# Patient Record
Sex: FEMALE | Race: WHITE | NOT HISPANIC OR LATINO | Employment: UNEMPLOYED | URBAN - METROPOLITAN AREA
[De-identification: names, ages, dates, MRNs, and addresses within clinical notes are randomized per-mention and may not be internally consistent; named-entity substitution may affect disease eponyms.]

---
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
## 2022-04-30 NOTE — ED PROVIDER NOTES
Patient:   Alexandria Carranza             MRN: [id number]            FIN: [id number]               Age:   20 years     Sex:  Female     :  2000   Associated Diagnoses:   2019 novel coronavirus disease (COVID-19); Dizziness   Author:   Danielito Montilla MD      Basic Information   Time seen: Date & time 2020 09:01:00.   History source: Patient.   Arrival mode: Private vehicle.   Additional information: Chief Complaint from Nursing Triage Note : Chief Complaint   2020 8:47 CST      Chief Complaint           Father states patient's mother is +covid, states pt has been c/o dizziness, states patient orthostatic at home, also c/o generalized weakness  .   Provider/Visit info:   Time Seen:  Danielito Montilla MD / 2020 08:56  .   History of Present Illness   The patient presents with Weakness and dizziness.  The onset was 1  days ago.  The course/duration of symptoms is constant.  The degree at onset was moderate.  The degree at present is moderate.  Risk factors consist of History of Down syndrome with PFO repair.  History of obstructive sleep apnea.  Associated symptoms: dizziness.        Review of Systems   Constitutional symptoms:  No fever,    Respiratory symptoms:  No shortness of breath,    Cardiovascular symptoms:  No chest pain,    Gastrointestinal symptoms:  No abdominal pain, no nausea, no vomiting, no diarrhea, no rectal bleeding.    Musculoskeletal symptoms:  No back pain,    Neurologic symptoms:  No headache,              Additional review of systems information: All other systems reviewed and otherwise negative.      Health Status   Allergies:    Allergic Reactions (Selected)  No Known Allergies,    Allergies (1) Active Reaction  No Known Allergies None Documented  .      Past Medical/ Family/ Social History   Medical history:    No active or resolved past medical history items have been selected or recorded..   Surgical history:    No active procedure history items have been  selected or recorded..   Family history:    No family history items have been selected or recorded..   Social history:    Social & Psychosocial Habits    Alcohol  04/19/2018  Concerns about alcohol use in household: No    Tobacco  04/19/2018  Concerns about tobacco use in household: No    11/27/2020  Use: Never (less than 100 in l    Patient Wants Consult For Cessation Counseling No    Abuse/Neglect  11/27/2020  SHX Any signs of abuse or neglect No  .   Problem list:    Active Problems (2)  Down syndrome   Sleep apnea   .      Physical Examination               Vital Signs   Vital Signs   11/27/2020 8:47 CST      Temperature Temporal Artery               36.4 DegC                             Peripheral Pulse Rate     77 bpm                             Respiratory Rate          18 br/min                             SpO2                      98 %                             Oxygen Therapy            Room air                             Systolic Blood Pressure   102 mmHg                             Diastolic Blood Pressure  72 mmHg  .      Vital Signs (last 24 hrs)_____  Last Charted___________  Heart Rate Peripheral   77 bpm  (NOV 27 08:47)  Resp Rate         18 br/min  (NOV 27 08:47)  SBP      102 mmHg  (NOV 27 08:47)  DBP      72 mmHg  (NOV 27 08:47)  SpO2      98 %  (NOV 27 08:47)  Weight      73 kg  (NOV 27 08:47)  Height      152 cm  (NOV 27 08:47)  BMI      31.6  (NOV 27 08:47)  .   Measurements   11/27/2020 8:47 CST      Weight Dosing             73 kg                             Weight Measured           73 kg                             Weight Measured and Calculated in Lbs     160.94 lb                             Weight Estimated          73 kg                             Height/Length Dosing      152 cm                             Height/Length Measured    152 cm                             Height/Length Estimated   152 cm                             Body Mass Index Estimated 31.6 kg/m2                              Body Mass Index Measured  31.6 kg/m2  .   Basic Oxygen Information   11/27/2020 8:47 CST      SpO2                      98 %                             Oxygen Therapy            Room air  .   General:  Alert, no acute distress.    Skin:  Warm, pink, intact, moist, no rash, normal for ethnicity, not cyanotic, not cool, not pale.    Head:  Normocephalic, atraumatic.    Neck:  Supple, trachea midline, no tenderness, no JVD, no carotid bruit.    Eye:  Pupils are equal, round and reactive to light, extraocular movements are intact, normal conjunctiva, vision grossly normal.    Ears, nose, mouth and throat:  Tympanic membranes clear, oral mucosa moist, no pharyngeal erythema or exudate.    Cardiovascular:  Regular rate and rhythm, No murmur, Normal peripheral perfusion, No edema.    Respiratory:  Lungs are clear to auscultation, respirations are non-labored, breath sounds are equal, Symmetrical chest wall expansion.    Chest wall:  No tenderness, No deformity.    Back:  Nontender, Normal range of motion, Normal alignment, no step-offs.    Musculoskeletal:  Normal ROM, normal strength, no tenderness, no swelling, no deformity.    Gastrointestinal:  Soft, Nontender, Non distended, Normal bowel sounds, No organomegaly.    Genitourinary   Neurological:  Alert and oriented to person, place, time, and situation, No focal neurological deficit observed, CN II-XII intact, normal sensory observed, normal motor observed, normal speech observed, normal coordination observed.    Lymphatics:  No lymphadenopathy.   Psychiatric:  Cooperative, appropriate mood & affect, normal judgment.       Medical Decision Making   Differential Diagnosis:  Orthostatic hypotension, Covid infection.   Results review:  Lab results : Lab View   11/27/2020 12:22 CST     Magnesium Lvl             1.91 mg/dL                             EAG                       88.2 mg/dL  NA                             Phosphorus                3.6 mg/dL                              LDH                       135 unit/L  LOW                             Hgb A1c                   4.7 %                             TSH                       0.7243 uIU/mL                             Blood Culture             Review                             Blood Culture             Review    11/27/2020 10:48 CST     UA Appear                 Clear                             UA Color                  YELLOW                             UA Spec Grav              1.018                             UA Bili                   Negative                             UA pH                     6.5                             UA Urobilinogen           Normal                             UA Blood                  Negative                             UA Glucose                Negative                             UA Ketones                Negative                             UA Protein                Negative                             UA Nitrite                Negative                             UA Leuk Est               Negative                             UA WBC Interp             0-2 /HPF                             UA RBC Interp             0-2 /HPF                             UA Bact Interp            None Seen /HPF                             UA Squam Epi Interp        /LPF                             UA Hyal Cast Interp       3-5 /LPF    11/27/2020 10:03 CST     Est Creat Clearance Ser   75.59 mL/min    11/27/2020 9:41 CST      COVID-19 Rapid            POSITIVE    11/27/2020 9:33 CST      Sodium Lvl                140 mmol/L                             Potassium Lvl             4.1 mmol/L                             Chloride                  105 mmol/L                             CO2                       27 mmol/L                             Calcium Lvl               9.0 mg/dL                             Glucose Lvl               102 mg/dL  HI                             BUN                        12.0 mg/dL                             Creatinine                0.86 mg/dL                             eGFR-AA                   >105                             eGFR-NILESH                  91 mL/min/1.73 m2                             Bili Total                1.0 mg/dL                             Bili Direct               0.4 mg/dL                             Bili Indirect             0.60 mg/dL                             AST                       17 unit/L                             ALT                       13 unit/L                             Alk Phos                  65 unit/L                             Total Protein             6.8 gm/dL                             Albumin Lvl               3.9 gm/dL                             Globulin                  2.9 gm/dL                             A/G Ratio                 1.3 ratio                             Ferritin Lvl              204.61 ng/mL  HI                             CRP                       0.41 mg/dL                             Total CK                  38 U/L                             Troponin-I                <0.010 ng/mL                             D-Dimer                   0.29 mcg/ml FEU                             WBC                       3.3 x10(3)/mcL  LOW                             RBC                       4.55 x10(6)/mcL                             Hgb                       14.8 gm/dL                             Hct                       44.4 %                             Platelet                  176 x10(3)/mcL                             MCV                       97.6 fL                             MCH                       32.5 pg                             MCHC                      33.3 gm/dL                             RDW                       12.1 %                             MPV                       9.1 fL                             Abs Neut                  1.78 x10(3)/mcL  LOW                             Neutro Auto                54 %  NA                             Lymph Auto                31 %  NA                             Mono Auto                 13 %  NA                             Eos Auto                  1 %  NA                             Abs Eos                   0.0 x10(3)/mcL                             Basophil Auto             1 %  NA                             Abs Neutro                1.78 x10(3)/mcL  LOW                             Abs Lymph                 1.0 x10(3)/mcL                             Abs Mono                  0.4 x10(3)/mcL                             Abs Baso                  0.0 x10(3)/mcL                             IG%                       0 %  NA                             IG#                       0.010  NA                             Sed Rate                  3 mm/hr  ,    No qualifying data available, All Results, Interpretation.    Chest X-Ray:  No acute disease process.   Notes:  20-year-old female with a history of Down syndrome and obstructive sleep apnea as well as recently COVID-19 positive presents with orthostatic hypotension.  She is otherwise well-appearing and nontoxic.  She will be admitted to internal medicine for supportive care..      Reexamination/ Reevaluation   Vital signs   Basic Oxygen Information   11/27/2020 8:47 CST      SpO2                      98 %                             Oxygen Therapy            Room air        Impression and Plan   Diagnosis   2019 novel coronavirus disease (COVID-19) ([id number] U07.1)   Dizziness (PNED [id number])   Plan   Condition: Stable.    Disposition: Admit to Inpatient Unit.    Counseled: Patient, Regarding diagnosis, Regarding diagnostic results, Regarding treatment plan, Patient indicated understanding of instructions.

## 2022-05-03 NOTE — HISTORICAL [facility]
This is a historical note converted from Joceline. Formatting and pictures may have been removed.  Please reference Joceline for original formatting and attached multimedia. Chief Complaint  Father states patients mother is +covid, states pt has been c/o dizziness, states patient orthostatic at home, also c/o generalized weakness  History of Present Illness  Ms. Carranza is a 20 yoF with PMHx significant for Down syndrome, Hx of PFO w/repair, and DEAN who presented to [facility] ED on 11/27/2020 with complaints of dizziness x 1 day.?Patient states that she was not feeling well yesterday and this morning, with some associated dizziness and orthostatic hypotension, but no syncope or LOC.? Patient was orthostatic positive at home, with increased HR and decreased BP upon standing?.? Patient has known COVID exposure, and tested positive for COVID-19 on arrival to ED.?Patient has very pleasant affect,?and did not seem in any acute distress?on presentation, 99% on room air, with no concerning?CXR findings?or electrolyte derangements, negative troponin and inflammatory markers pending. ??Patient was given 1 L bolus of LR in the ED.  Review of Systems  Constitutional:?no fever, fatigue, weakness  Respiratory:?no cough, no wheezing, no shortness of breath  Cardiovascular:?no chest pain, no palpitations, no edema  Gastrointestinal:?no nausea, vomiting, or diarrhea. No abdominal pain  Genitourinary:?no dysuria, no urinary frequency or urgency, no hematuria  Hema/Lymph:?no abnormal bruising or bleeding  Integumentary:?no skin rash or abnormal lesion  Neurologic: no headache, no dizziness, no weakness or numbness  ?  Physical Exam  Vitals & Measurements  T:?36.4? ?C (Temporal Artery)? HR:?62(Peripheral)? HR:?61(Monitored)? RR:?16? BP:?102/64? BP:?101/60(Sitting)? BP:?94/53(Standing)? BP:?104/69(Supine)? SpO2:?100%? WT:?73?kg? BMI:?31.6?  General: very pleasant,?well-developed well-nourished female in no acute distress  Neck: full range of  motion, no thyromegaly or lymphadenopathy  Respiratory:?clear to auscultation bilaterally, no wheezing, no respiratory distress,?no accessory muscle use  Cardiovascular:?regular rate and rhythm without murmurs, gallops or rubs, no peripheral edema  Gastrointestinal:?soft, non-tender, non-distended with normal bowel sounds, without masses to palpation  Genitourinary: no CVA tenderness to palpation  Musculoskeletal:?full range of motion of all extremities/spine without limitation or discomfort  Integumentary: no rashes or skin lesions present, bilateral?knee scars from previous surgeries, hyperkeratosis of bilateral feet  Neurologic:? no signs of peripheral neurological deficit, motor/sensory function intact, answers questions well and appropriately  ?  Assessment/Plan  COVID Information:  Date of Symptom Onset:??11/26/2020  Date Positive:??11/27/2020  Hospital Day:??1  Oxygen Requirement:??Room air  Inflammatory Markers:?  ???? LDH Trend:??135  ???? D-Dimer Trend:??.29  ???? Ferritin Trend:??204.61  ???? ESR Trend:??3  ???? CRP Trend:??.41?  Renal Function:??BUN 12, creatinine 1.86  Antibiotics:?Azithromycin 500 mg daily,?ceftriaxone?1 g daily?(day 1)  Steroids:?Decadron 4 mg twice daily  Remdesivir:?Day 1  Anticoagulation:??Lovenox 40 mg twice daily  Nutrition:?Regular diet  Complicating factors/Medical problems:?Down syndrome,?Hx of PFO  -CXR on ED presentation revealed no acute cardiopulmonary findings, no bony abnormalities, with sternotomy wires in place from previous surgeries  -Repeat orthostatic vital signs?tomorrow at 8 AM  ?   History of allergies  -Patient takes levocetirizine at home, cetirizine?5 mg inpatient?(inpatient pharmacy does not have levocetirizine)  ?  Down syndrome,?Hx of PFO  -TTE on 11/27/2020, LVEF ~40%, with negative bubble study, no evidence of pericardial effusion, no evidence of pleural effusion, normal atria and ventricular sizes and no evidence of valvular abnormalities  -A1c  4.7  -TSH 0.54925  ?  Given concern for?past medical history?and known +COVID-19 testing, admitting to telemetry for close monitoring.? Patient not requiring supplemental oxygen at this time.? Starting?remdesivir, Decadron 4 mg twice daily,?azithromycin, ceftriaxone,?and anticoagulation with?Lovenox 40 mg twice daily. ?Will closely monitor for any respiratory changes, and inflammatory markers ordered q48hr, and?CBC, CMP ordered q24hr.   Problem List/Past Medical History  Ongoing  -nCoV  Down syndrome  Sleep apnea  Historical  No qualifying data  Procedure/Surgical History  PFO repair  Bilateral knee sx  Medications  Inpatient  azithromycin (Zithromax) for IVPB  Decadron, 4 mg= 1 mL, IV Push, BID  Lactated Ringers 1000ml 1,000 mL, 1000 mL, IV  remdesivir  Remdesivir (for IVPB)  Rocephin 2 g injection  Home  levocetirizine 5 mg oral tablet, 5 mg= 1 tab(s), Oral, qPM  Allergies  No Known Allergies  Social History  Abuse/Neglect  No, 2020  Alcohol  Household alcohol concerns: No., 2018  Tobacco  Never (less than 100 in lifetime), No, 2020  Household tobacco concerns: No., 2018  Lab Results  Labs Last 24 Hours?  ?Chemistry? Hematology/Coagulation?   Sodium Lvl: 140 mmol/L (20 09:33:00) D-Dimer: 0.29 mcg/ml FEU (20 09:33:00)   Potassium Lvl: 4.1 mmol/L (20 09:33:00) WBC:?3.3 x10(3)/mcL?Low (20 09:33:00)   Chloride: 105 mmol/L (20 09:33:00) RBC: 4.55 x10(6)/mcL (20 09:33:00)   CO2: 27 mmol/L (20 09:33:00) Hgb: 14.8 gm/dL (20 09:33:00)   Calcium Lvl: 9 mg/dL (20 09:33:00) Hct: 44.4 % (20 09:33:00)   Magnesium Lvl: 1.91 mg/dL (20 12:22:00) Platelet: 176 x10(3)/mcL (20 09:33:00)   Glucose Lvl:?102 mg/dL?High (20 09:33:00) MCV: 97.6 fL (20 09:33:00)   EA.2 mg/dL (20 12:22:00) MCH: 32.5 pg (20 09:33:00)   BUN: 12 mg/dL (20 09:33:00) MCHC: 33.3 gm/dL (20 09:33:00)   Creatinine: 0.86 mg/dL  (11/27/20 09:33:00) RDW: 12.1 % (11/27/20 09:33:00)   Est Creat Clearance Ser: 75.59 mL/min (11/27/20 10:03:56) MPV: 9.1 fL (11/27/20 09:33:00)   eGFR-AA: >105 (11/27/20 09:33:00) Abs Neut:?1.78 x10(3)/mcL?Low (11/27/20 09:33:00)   eGFR-NILESH: 91 mL/min/1.73 m2 (11/27/20 09:33:00) Neutro Auto: 54 % (11/27/20 09:33:00)   Bili Total: 1 mg/dL (11/27/20 09:33:00) Lymph Auto: 31 % (11/27/20 09:33:00)   Bili Direct: 0.4 mg/dL (11/27/20 09:33:00) Mono Auto: 13 % (11/27/20 09:33:00)   Bili Indirect: 0.6 mg/dL (11/27/20 09:33:00) Eos Auto: 1 % (11/27/20 09:33:00)   AST: 17 unit/L (11/27/20 09:33:00) Abs Eos: 0 x10(3)/mcL (11/27/20 09:33:00)   ALT: 13 unit/L (11/27/20 09:33:00) Basophil Auto: 1 % (11/27/20 09:33:00)   Alk Phos: 65 unit/L (11/27/20 09:33:00) Abs Neutro:?1.78 x10(3)/mcL?Low (11/27/20 09:33:00)   Total Protein: 6.8 gm/dL (11/27/20 09:33:00) Abs Lymph: 1 x10(3)/mcL (11/27/20 09:33:00)   Albumin Lvl: 3.9 gm/dL (11/27/20 09:33:00) Abs Mono: 0.4 x10(3)/mcL (11/27/20 09:33:00)   Globulin: 2.9 gm/dL (11/27/20 09:33:00) Abs Baso: 0 x10(3)/mcL (11/27/20 09:33:00)   A/G Ratio: 1.3 ratio (11/27/20 09:33:00) IG%: 0 % (11/27/20 09:33:00)   Phosphorus: 3.6 mg/dL (11/27/20 12:22:00) IG#: 0.01 (11/27/20 09:33:00)   LDH:?135 unit/L?Low (11/27/20 12:22:00) Sed Rate: 3 mm/hr (11/27/20 09:33:00)   Ferritin Lvl:?204.61 ng/mL?High (11/27/20 09:33:00) ?   Hgb A1c: 4.7 % (11/27/20 12:22:00) ?   CRP: 0.41 mg/dL (11/27/20 09:33:00) ?   Total CK: 38 U/L (11/27/20 09:33:00) ?   Troponin-I: <0.010 (11/27/20 09:33:00) ?   TSH: 0.7243 uIU/mL (11/27/20 12:22:00) ?   ?                                                                                                                                  ?  ?  Diagnostic Results  Accession:?[id number]  Order:?XR Chest 1 View  Report Dt/Tm:?11/27/2020 10:46  Report:?  CHEST 1 VIEW (PORTABLE):  ?  HISTORY: Dyspnea  ?  ?  ?  FINDINGS: The lungs are well expanded and well aerated. No shift of  the  mediastinum. No sizable pneumothorax or pleural effusion.  ?  IMPRESSION:  No active cardiopulmonary finding  ?  ?  ?      Starting maintenance fluids at 84ml/hr of LR to run overnight.? Ordering orthostatic vitals signs TID tomorrow, and placing cardiology consult due to reduced EF on TTE today.   I have reviewed the patients history, residents ?findings on physical examination, diagnosis and treatment plan. Care provided was reasonable and necessary.  Hx of PFO nad ASdDrepair in past.Bubble study neg..fractional shortening NMl. Ef approx 48%.No chamber dilatation. inflam markers :ferritin 204. trending in am. CXr clear.99% on roomair.On rocephin, ?zithromax and remdesvir. cardiac isos pending.BNP ordered.discussed with Dr. Carranza  ?  Addendum by Gonzalez POOLE, Hugo ARSHAD on November 28, 2020 10:27 CST?(Verified)  EKG NSR. no changes. No Rt axis  ?

## 2022-05-03 NOTE — HISTORICAL [facility]
This is a historical note converted from Cerkana. Formatting and pictures may have been removed.  Please reference Cerkana for original formatting and attached multimedia. Admit and Discharge Dates  Admit Date: 11/27/2020  Discharge Date: 11/29/2020  Physicians  Attending Physician - Gonzalez POOLE, Hugo ARSHAD  Admitting Physician - Gonzalez POOLE, Hugo ARSHAD  Consulting Physician - Renu POOLE, Leonardo NOONAN  Primary Care Physician - Tamia POOLE, [place]   Discharge Diagnosis  2019 novel coronavirus disease (COVID-19)?U07.1  Dizziness?[id number]  Admission Information  Ms. Carranza is a 20 yoF with PMHx significant for Down syndrome, Hx of PFO w/repair, and DEAN who presented to [facility] ED on 11/27/2020 with complaints of dizziness x 1 day. Patient states that she was not feeling well yesterday and this morning, with some associated dizziness and orthostatic hypotension, but no syncope or LOC. Patient was orthostatic positive at home, with increased HR and decreased BP upon standing . Patient has known COVID exposure, and tested positive for COVID-19 on arrival to ED. Patient has very pleasant affect, and did not seem in any acute distress on presentation, 99% on room air, with no concerning CXR findings or electrolyte derangements, negative troponin and inflammatory markers pending. Patient was given 1 L bolus of LR in the ED  ?  Hospital Course  Uneventful Patient admitted for dizziness due to Covid-19 infection.? Patient was orthostatically hypotensive which resolved with supplemental fluid administration.? Patient started on ceftriaxone, azithromycin, remdesivir, and Decadron while inpatient and had benefit of ID consult.? Inflammatory markers measures and WNL and continued to trend down while inpatient. Troponins and BNP remained negative while inpatient. ?Patient remained with good appetite while inpatient. ?Patient stable for discharge home on HD#3 with prescription for medrol dose pack.  Significant  Findings  Echo of heart revealed EF of 40%; however on further review appears to be issue with computer read. EF is like at least 48% but recommend follow up with patients cardiologist at discharge.  Time Spent on discharge  <30 minutes  Objective  Vitals & Measurements  T:?36.5? ?C (Oral)? TMIN:?36.4? ?C (Oral)? TMAX:?36.7? ?C (Oral)? HR:?96(Peripheral)? RR:?20? BP:?112/71? BP:?97/62(Sitting)? BP:?120/85(Standing)? BP:?105/57(Supine)? SpO2:?95%?  Physical Exam  For PE please see progress note from 11/29/2020  Patient Discharge Condition  Stable  Discharge Disposition  Good   Discharge Medication Reconciliation  Prescribed  methylPREDNISolone (Medrol Dosepak 4 mg oral tablet)?1 packet(s), Oral, Once  Continue  levocetirizine (levocetirizine 5 mg oral tablet)?5 mg, Oral, qPM  Education and Orders Provided  Understanding Coronavirus Disease 2019 (CUSTOM)  Discharge - 11/29/20 9:07:00 CST, Home, after dose of remdesivir today.?  Follow up  Anytime the conditions worsen, return to clinic  Report to Emergency Department if symptoms return or worsen  Shannon Cantrell MD, within 1 week      I have reviewed the patients history, residents??findings on physical examination, diagnosis and treatment plan. Care provided was reasonable and necessary.  ok to discharge on medrol dose pack  time spent on discharge <30 minutes

## 2022-07-26 ENCOUNTER — LAB VISIT (OUTPATIENT)
Dept: LAB | Facility: HOSPITAL | Age: 22
End: 2022-07-26
Attending: PEDIATRICS
Payer: COMMERCIAL

## 2022-07-26 DIAGNOSIS — Z00.00 ROUTINE GENERAL MEDICAL EXAMINATION AT A HEALTH CARE FACILITY: Primary | ICD-10-CM

## 2022-07-26 LAB
ALBUMIN SERPL-MCNC: 4 GM/DL (ref 3.5–5)
ALBUMIN/GLOB SERPL: 1.5 RATIO (ref 1.1–2)
ALP SERPL-CCNC: 60 UNIT/L (ref 40–150)
ALT SERPL-CCNC: 10 UNIT/L (ref 0–55)
AST SERPL-CCNC: 15 UNIT/L (ref 5–34)
BASOPHILS # BLD AUTO: 0.07 X10(3)/MCL (ref 0–0.2)
BASOPHILS NFR BLD AUTO: 1.8 %
BILIRUBIN DIRECT+TOT PNL SERPL-MCNC: 2.4 MG/DL
BUN SERPL-MCNC: 15.7 MG/DL (ref 7–18.7)
CALCIUM SERPL-MCNC: 9.1 MG/DL (ref 8.4–10.2)
CHLORIDE SERPL-SCNC: 104 MMOL/L (ref 98–107)
CHOLEST SERPL-MCNC: 106 MG/DL
CHOLEST/HDLC SERPL: 2 {RATIO} (ref 0–5)
CO2 SERPL-SCNC: 24 MMOL/L (ref 22–29)
CREAT SERPL-MCNC: 0.81 MG/DL (ref 0.55–1.02)
EOSINOPHIL # BLD AUTO: 0.04 X10(3)/MCL (ref 0–0.9)
EOSINOPHIL NFR BLD AUTO: 1 %
ERYTHROCYTE [DISTWIDTH] IN BLOOD BY AUTOMATED COUNT: 11.9 % (ref 11.5–17)
GLOBULIN SER-MCNC: 2.6 GM/DL (ref 2.4–3.5)
GLUCOSE SERPL-MCNC: 89 MG/DL (ref 74–100)
HCT VFR BLD AUTO: 42.4 % (ref 37–47)
HDLC SERPL-MCNC: 60 MG/DL (ref 35–60)
HGB BLD-MCNC: 14.5 GM/DL (ref 12–16)
IMM GRANULOCYTES # BLD AUTO: 0.01 X10(3)/MCL (ref 0–0.04)
IMM GRANULOCYTES NFR BLD AUTO: 0.3 %
LDLC SERPL CALC-MCNC: 30 MG/DL (ref 50–140)
LYMPHOCYTES # BLD AUTO: 1.62 X10(3)/MCL (ref 0.6–4.6)
LYMPHOCYTES NFR BLD AUTO: 41.5 %
MCH RBC QN AUTO: 32.9 PG (ref 27–31)
MCHC RBC AUTO-ENTMCNC: 34.2 MG/DL (ref 33–36)
MCV RBC AUTO: 96.1 FL (ref 80–94)
MONOCYTES # BLD AUTO: 0.37 X10(3)/MCL (ref 0.1–1.3)
MONOCYTES NFR BLD AUTO: 9.5 %
NEUTROPHILS # BLD AUTO: 1.8 X10(3)/MCL (ref 2.1–9.2)
NEUTROPHILS NFR BLD AUTO: 45.9 %
NRBC BLD AUTO-RTO: 0 %
PLATELET # BLD AUTO: 214 X10(3)/MCL (ref 130–400)
PMV BLD AUTO: 8.9 FL (ref 7.4–10.4)
POTASSIUM SERPL-SCNC: 4.2 MMOL/L (ref 3.5–5.1)
PROT SERPL-MCNC: 6.6 GM/DL (ref 6.4–8.3)
RBC # BLD AUTO: 4.41 X10(6)/MCL (ref 4.2–5.4)
SODIUM SERPL-SCNC: 139 MMOL/L (ref 136–145)
T4 FREE SERPL-MCNC: 0.89 NG/DL (ref 0.7–1.48)
TRIGL SERPL-MCNC: 78 MG/DL (ref 37–140)
TSH SERPL-ACNC: 1.5 UIU/ML (ref 0.35–4.94)
VLDLC SERPL CALC-MCNC: 16 MG/DL
WBC # SPEC AUTO: 3.9 X10(3)/MCL (ref 4.5–11.5)

## 2022-07-26 PROCEDURE — 36415 COLL VENOUS BLD VENIPUNCTURE: CPT

## 2022-07-26 PROCEDURE — 80053 COMPREHEN METABOLIC PANEL: CPT

## 2022-07-26 PROCEDURE — 84443 ASSAY THYROID STIM HORMONE: CPT

## 2022-07-26 PROCEDURE — 80061 LIPID PANEL: CPT

## 2022-07-26 PROCEDURE — 85025 COMPLETE CBC W/AUTO DIFF WBC: CPT

## 2022-07-26 PROCEDURE — 84439 ASSAY OF FREE THYROXINE: CPT

## 2022-08-01 ENCOUNTER — HOSPITAL ENCOUNTER (OUTPATIENT)
Dept: RADIOLOGY | Facility: HOSPITAL | Age: 22
Discharge: HOME | End: 2022-08-01
Attending: PEDIATRICS
Payer: COMMERCIAL

## 2022-08-01 DIAGNOSIS — R10.11 RIGHT UPPER QUADRANT PAIN: Primary | ICD-10-CM

## 2022-08-01 DIAGNOSIS — R10.11 RIGHT UPPER QUADRANT PAIN: ICD-10-CM

## 2022-08-01 DIAGNOSIS — R10.11 ABDOMINAL PAIN, RIGHT UPPER QUADRANT: Primary | ICD-10-CM

## 2022-08-01 DIAGNOSIS — R10.11 ABDOMINAL PAIN, RIGHT UPPER QUADRANT: ICD-10-CM

## 2022-08-01 PROCEDURE — 74018 RADEX ABDOMEN 1 VIEW: CPT | Mod: TC

## 2022-08-01 PROCEDURE — 76700 US EXAM ABDOM COMPLETE: CPT | Mod: TC

## 2022-08-02 ENCOUNTER — HOSPITAL ENCOUNTER (OUTPATIENT)
Dept: RADIOLOGY | Facility: HOSPITAL | Age: 22
Discharge: HOME | End: 2022-08-02
Attending: PEDIATRICS
Payer: COMMERCIAL

## 2022-08-02 DIAGNOSIS — R10.11 ABDOMINAL PAIN, RIGHT UPPER QUADRANT: ICD-10-CM

## 2022-08-02 PROCEDURE — A9537 TC99M MEBROFENIN: HCPCS

## 2022-08-02 PROCEDURE — 78226 HEPATOBILIARY SYSTEM IMAGING: CPT | Mod: TC

## 2023-05-18 DIAGNOSIS — G43.809 VESTIBULAR MIGRAINE: Primary | ICD-10-CM

## 2023-06-26 NOTE — PROGRESS NOTES
[facility] Neurology Initial Office Visit Note    Initial Visit Date: 6/27/2023  Current Visit Date:  06/27/2023    Chief Complaint:     No chief complaint on file.      History of Present Illness:      This is 23 y.o. female with history of Down syndrome, who is referred for headache disorder.    Age of Onset : 22 years old, during a car ride.    Headache Description:  Dizzy, swings sensation, followed by frontal headache, without nausea, with photo and phonophobia.      Frequency: daily headache days per month.     Provocation Factors:  Motion, car rides, quick eye movements, not sleeping well.    Risk Factors  - Family history of headache disorder: Yes extensive paternal family history of migraine disorder.      Medications:     Current Prophylactic  Amitriptyline 10 mg at bedtime (5/1/2023):  Takes 5 mg at bedtime.  Effective.  Sedating.    Current Abortive  Rizatriptan 5 mg twice a day as needed (5/1/2023):  Unclear efficacy.  Metoclopramide 5 mg twice a day as needed (5/1/2023):  Not needed.  Meclizine 12.5 mg twice a day as needed (4/19/2023):  Unclear of effective for car sickness.      Devices:     - VNS:  - TNS  - TMS:     Procedures:     - Botox:  - PSG block:   - Occipital nerve block:     Labs:     Results for orders placed or performed in visit on 08/01/22   Comprehensive Metabolic Panel   Result Value Ref Range    Sodium Level 140 136 - 145 mmol/L    Potassium Level 4.6 3.5 - 5.1 mmol/L    Chloride 104 98 - 107 mmol/L    Carbon Dioxide 26 22 - 29 mmol/L    Glucose Level 75 74 - 100 mg/dL    Blood Urea Nitrogen 13.5 7.0 - 18.7 mg/dL    Creatinine 0.81 0.55 - 1.02 mg/dL    Calcium Level Total 9.2 8.4 - 10.2 mg/dL    Protein Total 6.7 6.4 - 8.3 gm/dL    Albumin Level 4.2 3.5 - 5.0 gm/dL    Globulin 2.5 2.4 - 3.5 gm/dL    Albumin/Globulin Ratio 1.7 1.1 - 2.0 ratio    Bilirubin Total 1.4 <=1.5 mg/dL    Alkaline Phosphatase 52 40 - 150 unit/L    Alanine Aminotransferase 13 0 - 55 unit/L    Aspartate  Aminotransferase 18 5 - 34 unit/L    Estimated GFR-Non  >60 mls/min/1.73/m2   CBC with Differential   Result Value Ref Range    WBC 3.8 (L) 4.5 - 11.5 x10(3)/mcL    RBC 4.27 4.20 - 5.40 x10(6)/mcL    Hgb 14.0 12.0 - 16.0 gm/dL    Hct 41.1 37.0 - 47.0 %    MCV 96.3 (H) 80.0 - 94.0 fL    MCH 32.8 (H) 27.0 - 31.0 pg    MCHC 34.1 33.0 - 36.0 mg/dL    RDW 12.0 11.5 - 17.0 %    Platelet 228 130 - 400 x10(3)/mcL    MPV 9.4 7.4 - 10.4 fL    Neut % 43.1 %    Lymph % 45.1 %    Mono % 8.8 %    Eos % 1.1 %    Basophil % 1.6 %    Lymph # 1.70 0.6 - 4.6 x10(3)/mcL    Neut # 1.6 (L) 2.1 - 9.2 x10(3)/mcL    Mono # 0.33 0.1 - 1.3 x10(3)/mcL    Eos # 0.04 0 - 0.9 x10(3)/mcL    Baso # 0.06 0 - 0.2 x10(3)/mcL    IG# 0.01 0 - 0.04 x10(3)/mcL    IG% 0.3 %    NRBC% 0.0 %   Amylase   Result Value Ref Range    Amylase Level 45 25 - 125 unit/L   Lipase   Result Value Ref Range    Lipase Level 11 <=60 U/L       Studies:     - MRI  Orbit 4/21/2023 at [facility]:  Results not available.  Reportedly normal.  - MRA Head w/o Luciano:   - MRV Head w/o Luciano:   - NCHCT:  - Lumbar Puncture:    Review of Systems:     Review of Systems   All other systems reviewed and are negative.    Physical Exams:     Vitals:    06/27/23 1006   BP: 113/72   Pulse: 75   Temp: 97.4 °F (36.3 °C)       Physical Exam  Vitals and nursing note reviewed.   Constitutional:       Appearance: Normal appearance.   HENT:      Head: Atraumatic.      Comments: Syndrome facies     Nose: Nose normal.      Mouth/Throat:      Mouth: Mucous membranes are moist.      Pharynx: Oropharynx is clear.   Eyes:      Conjunctiva/sclera: Conjunctivae normal.   Cardiovascular:      Rate and Rhythm: Normal rate and regular rhythm.      Pulses: Normal pulses.   Pulmonary:      Effort: Pulmonary effort is normal.      Breath sounds: Normal breath sounds.   Abdominal:      General: Abdomen is flat.   Musculoskeletal:         General: Normal range of motion.      Cervical back: Normal range of  motion.   Skin:     General: Skin is warm.   Neurological:      Mental Status: She is alert.       Comprehensive Neurological Exam:  Mental Status: Alert Oriented to Self, Date, and Place. Comprehension wnl. No dysarthria.   CN II - XII: TAMRA, No APD, Fundus wnl OU, VFFC, No ptosis OU, EOMI without nystagmus, OS exophoria on cross cover, normal saccadic eye movements on OKN strip, no saccadic intrusions, LT/Temp symmetric in CN V1-3 distribution, Hearing grossly intact, Face Symmetric, Tongue and Uvula midline, Trapezius symmetric bilateral.   Motor: tone and bulk wnl throughout, no abnormal involuntary or voluntary movements, 5/5 to confrontation, Fine finger movements wnl b/l, No pronator drift.   Sensory: LT, Proprioception, Vibration, PP, Temp symmetric.   Reflexes: 1+ throughout  Cerebellar: FNF wnl b/l, RAHM wnl b/l  Romberg:  Absent.  Gait: normal.     Assessment:     This is 23 y.o. female with history of Down syndrome, who is referred for motion sickness and vestibular migraines.  Mild OS exophoria noted on exam.    Problem List Items Addressed This Visit    None  Visit Diagnoses       Motion sickness, sequela    -  Primary    Relevant Medications    meclizine (ANTIVERT) 12.5 mg tablet    Vestibular migraine        Relevant Medications    amitriptyline (ELAVIL) 10 MG tablet            Plan:     [] Continue with Amitriptyline 5 mg at bedtime   [] Continue with Rizatriptan 5 mg twice a day as needed   [] Discontinue Metoclopramide 5 mg twice a day as needed   [] Start Meclizine 12.5 mg 4 times a day as needed, 30 minutes to an hour before car rides    RTC 3 months    Headache education provided: good sleep hygiene, stress management, medication overuse education provided. [email or web address].     I have explained the treatment plan, diagnosis, and prognosis to patient. All questions are answered to the best of my knowledge.     Face to face time 45 minutes, including documentation, chart review, counseling, education,  review of test results, relevant medical records, and coordination of care.       Korin Kaye MD   General Neurology  06/27/2023

## 2023-06-27 ENCOUNTER — OFFICE VISIT (OUTPATIENT)
Dept: NEUROLOGY | Facility: CLINIC | Age: 23
End: 2023-06-27
Payer: COMMERCIAL

## 2023-06-27 VITALS
HEART RATE: 75 BPM | DIASTOLIC BLOOD PRESSURE: 72 MMHG | TEMPERATURE: 97 F | BODY MASS INDEX: 32.59 KG/M2 | OXYGEN SATURATION: 98 % | SYSTOLIC BLOOD PRESSURE: 113 MMHG | WEIGHT: 166 LBS | HEIGHT: 60 IN

## 2023-06-27 DIAGNOSIS — G43.809 VESTIBULAR MIGRAINE: ICD-10-CM

## 2023-06-27 DIAGNOSIS — T75.3XXS MOTION SICKNESS, SEQUELA: Primary | ICD-10-CM

## 2023-06-27 PROBLEM — Q90.9 DOWN SYNDROME: Status: ACTIVE | Noted: 2022-11-10

## 2023-06-27 PROCEDURE — 99214 OFFICE O/P EST MOD 30 MIN: CPT | Mod: PBBFAC | Performed by: PSYCHIATRY & NEUROLOGY

## 2023-06-27 PROCEDURE — 3074F SYST BP LT 130 MM HG: CPT | Mod: CPTII,,, | Performed by: PSYCHIATRY & NEUROLOGY

## 2023-06-27 PROCEDURE — 3078F PR MOST RECENT DIASTOLIC BLOOD PRESSURE < 80 MM HG: ICD-10-PCS | Mod: CPTII,,, | Performed by: PSYCHIATRY & NEUROLOGY

## 2023-06-27 PROCEDURE — 3008F BODY MASS INDEX DOCD: CPT | Mod: CPTII,,, | Performed by: PSYCHIATRY & NEUROLOGY

## 2023-06-27 PROCEDURE — 3008F PR BODY MASS INDEX (BMI) DOCUMENTED: ICD-10-PCS | Mod: CPTII,,, | Performed by: PSYCHIATRY & NEUROLOGY

## 2023-06-27 PROCEDURE — 3074F PR MOST RECENT SYSTOLIC BLOOD PRESSURE < 130 MM HG: ICD-10-PCS | Mod: CPTII,,, | Performed by: PSYCHIATRY & NEUROLOGY

## 2023-06-27 PROCEDURE — 1159F PR MEDICATION LIST DOCUMENTED IN MEDICAL RECORD: ICD-10-PCS | Mod: CPTII,,, | Performed by: PSYCHIATRY & NEUROLOGY

## 2023-06-27 PROCEDURE — 99204 OFFICE O/P NEW MOD 45 MIN: CPT | Mod: S$PBB,,, | Performed by: PSYCHIATRY & NEUROLOGY

## 2023-06-27 PROCEDURE — 1159F MED LIST DOCD IN RCRD: CPT | Mod: CPTII,,, | Performed by: PSYCHIATRY & NEUROLOGY

## 2023-06-27 PROCEDURE — 3078F DIAST BP <80 MM HG: CPT | Mod: CPTII,,, | Performed by: PSYCHIATRY & NEUROLOGY

## 2023-06-27 PROCEDURE — 99204 PR OFFICE/OUTPT VISIT, NEW, LEVL IV, 45-59 MIN: ICD-10-PCS | Mod: S$PBB,,, | Performed by: PSYCHIATRY & NEUROLOGY

## 2023-06-27 RX ORDER — MECLIZINE HCL 12.5 MG 12.5 MG/1
12.5 TABLET ORAL 4 TIMES DAILY PRN
Qty: 360 TABLET | Refills: 1 | Status: SHIPPED | OUTPATIENT
Start: 2023-06-27 | End: 2023-10-11 | Stop reason: ALTCHOICE

## 2023-06-27 RX ORDER — AMITRIPTYLINE HYDROCHLORIDE 10 MG/1
10 TABLET, FILM COATED ORAL NIGHTLY
Qty: 15 TABLET
Start: 2023-06-27 | End: 2023-10-11 | Stop reason: ALTCHOICE

## 2023-06-27 RX ORDER — AMITRIPTYLINE HYDROCHLORIDE 10 MG/1
5 TABLET, FILM COATED ORAL NIGHTLY
COMMUNITY
Start: 2023-05-01 | End: 2023-06-27

## 2023-06-27 RX ORDER — MINERAL OIL
180 ENEMA (ML) RECTAL DAILY
COMMUNITY
End: 2023-10-11 | Stop reason: ALTCHOICE

## 2023-06-27 RX ORDER — MECLIZINE HCL 12.5 MG 12.5 MG/1
12.5 TABLET ORAL
COMMUNITY
Start: 2023-04-19 | End: 2023-06-27 | Stop reason: SDUPTHER

## 2023-06-27 RX ORDER — RIZATRIPTAN BENZOATE 5 MG/1
5 TABLET ORAL
COMMUNITY
Start: 2023-05-01 | End: 2023-10-11 | Stop reason: ALTCHOICE

## 2023-06-27 RX ORDER — METOCLOPRAMIDE 5 MG/1
5 TABLET ORAL EVERY 6 HOURS PRN
COMMUNITY
Start: 2023-05-01 | End: 2023-06-27

## 2023-10-09 NOTE — PROGRESS NOTES
[facility] Neurology Initial Office Visit Note    Initial Visit Date: 6/27/2023  Last Visit Date: 6/27/2023  Current Visit Date:  10/11/2023    Chief Complaint:     Chief Complaint   Patient presents with    Patient presents today with a hx of frequent headaches     Patient's mom mentioned that she got glasses in 07/2023 and she feels like her headaches were mostly related to her eye pain. Patient herself states that she currently has a headache and she has them just not as frequently       History of Present Illness:      This is 23 y.o. female with history of Down syndrome with motion sickness and vestibular migraines.  Mild OS exophoria noted on exam. During last visit, she was started on Meclizine 12.5 mg 4 times a day as needed for motion sickness. Doing much better with neuro lenses.     Age of Onset : 22 years old, during a car ride.    Headache Description:  Dizzy, swings sensation, followed by frontal headache, without nausea, with photo and phonophobia.      Frequency: rare.     Provocation Factors:  Motion, car rides, quick eye movements, not sleeping well.    Risk Factors  - Family history of headache disorder: Yes extensive paternal family history of migraine disorder.      Medications:     Current Prophylactic  Amitriptyline 10 mg at bedtime (5/1/2023): no longer taking.     Current Abortive  Rizatriptan 5 mg twice a day as needed (5/1/2023):  Unclear efficacy.  Metoclopramide 5 mg twice a day as needed (5/1/2023):  Not needed.  Meclizine 12.5 mg twice a day as needed (4/19/2023):  Unclear if effective for car sickness.      Devices:     - VNS:  - TNS  - TMS:     Procedures:     - Botox:  - PSG block:   - Occipital nerve block:     Labs:     Results for orders placed or performed in visit on 08/01/22   Comprehensive Metabolic Panel   Result Value Ref Range    Sodium Level 140 136 - 145 mmol/L    Potassium Level 4.6 3.5 - 5.1 mmol/L    Chloride 104 98 - 107 mmol/L    Carbon Dioxide 26 22 - 29 mmol/L     Glucose Level 75 74 - 100 mg/dL    Blood Urea Nitrogen 13.5 7.0 - 18.7 mg/dL    Creatinine 0.81 0.55 - 1.02 mg/dL    Calcium Level Total 9.2 8.4 - 10.2 mg/dL    Protein Total 6.7 6.4 - 8.3 gm/dL    Albumin Level 4.2 3.5 - 5.0 gm/dL    Globulin 2.5 2.4 - 3.5 gm/dL    Albumin/Globulin Ratio 1.7 1.1 - 2.0 ratio    Bilirubin Total 1.4 <=1.5 mg/dL    Alkaline Phosphatase 52 40 - 150 unit/L    Alanine Aminotransferase 13 0 - 55 unit/L    Aspartate Aminotransferase 18 5 - 34 unit/L    Estimated GFR-Non  >60 mls/min/1.73/m2   CBC with Differential   Result Value Ref Range    WBC 3.8 (L) 4.5 - 11.5 x10(3)/mcL    RBC 4.27 4.20 - 5.40 x10(6)/mcL    Hgb 14.0 12.0 - 16.0 gm/dL    Hct 41.1 37.0 - 47.0 %    MCV 96.3 (H) 80.0 - 94.0 fL    MCH 32.8 (H) 27.0 - 31.0 pg    MCHC 34.1 33.0 - 36.0 mg/dL    RDW 12.0 11.5 - 17.0 %    Platelet 228 130 - 400 x10(3)/mcL    MPV 9.4 7.4 - 10.4 fL    Neut % 43.1 %    Lymph % 45.1 %    Mono % 8.8 %    Eos % 1.1 %    Basophil % 1.6 %    Lymph # 1.70 0.6 - 4.6 x10(3)/mcL    Neut # 1.6 (L) 2.1 - 9.2 x10(3)/mcL    Mono # 0.33 0.1 - 1.3 x10(3)/mcL    Eos # 0.04 0 - 0.9 x10(3)/mcL    Baso # 0.06 0 - 0.2 x10(3)/mcL    IG# 0.01 0 - 0.04 x10(3)/mcL    IG% 0.3 %    NRBC% 0.0 %   Amylase   Result Value Ref Range    Amylase Level 45 25 - 125 unit/L   Lipase   Result Value Ref Range    Lipase Level 11 <=60 U/L       Studies:     - MRI  Orbit 4/21/2023 at [facility]:  Results not available.  Reported normal.  - MRA Head w/o Luciano:   - MRV Head w/o Luciano:   - NCHCT:  - Lumbar Puncture:    Review of Systems:     Review of Systems   All other systems reviewed and are negative.      Physical Exams:     Vitals:    10/11/23 1455   BP: 104/66   Pulse: (!) 50   Temp: 97.3 °F (36.3 °C)         Physical Exam  Vitals and nursing note reviewed.   Constitutional:       Appearance: Normal appearance.   HENT:      Head: Atraumatic.      Comments: Syndrome facies     Nose: Nose normal.      Mouth/Throat:      Mouth:  Mucous membranes are moist.      Pharynx: Oropharynx is clear.   Eyes:      Conjunctiva/sclera: Conjunctivae normal.   Cardiovascular:      Rate and Rhythm: Normal rate and regular rhythm.      Pulses: Normal pulses.   Pulmonary:      Effort: Pulmonary effort is normal.      Breath sounds: Normal breath sounds.   Abdominal:      General: Abdomen is flat.   Musculoskeletal:         General: Normal range of motion.      Cervical back: Normal range of motion.   Skin:     General: Skin is warm.   Neurological:      Mental Status: She is alert.         Comprehensive Neurological Exam:  Mental Status: Alert Oriented to Self, Date, and Place. Comprehension wnl. No dysarthria.   CN II - XII: TAMRA, No APD, Fundus wnl OU, VFFC, No ptosis OU, EOMI without nystagmus, OS exophoria on cross cover, normal saccadic eye movements on OKN strip, no saccadic intrusions, LT/Temp symmetric in CN V1-3 distribution, Hearing grossly intact, Face Symmetric, Tongue and Uvula midline, Trapezius symmetric bilateral.   Motor: tone and bulk wnl throughout, no abnormal involuntary or voluntary movements, 5/5 to confrontation, Fine finger movements wnl b/l, No pronator drift.   Sensory: LT, Proprioception, Vibration, PP, Temp symmetric.   Reflexes: 1+ throughout  Cerebellar: FNF wnl b/l, RAHM wnl b/l  Romberg:  Absent.  Gait: normal.     Assessment:     This is 23 y.o. female with history of Down syndrome, who is referred for motion sickness and vestibular migraines.  Mild OS exophoria noted on exam.    Problem List Items Addressed This Visit    None  Visit Diagnoses       Vestibular migraine    -  Primary    Motion sickness, sequela                Plan:     [] stop Amitriptyline   [] stop Rizatriptan 5 mg twice a day as needed   [] Continue with Meclizine 12.5 mg 4 times a day as needed, 30 minutes to an hour before car rides. Consider balance training if motion sickness gets worse.     RTC PRN     Headache education provided: good sleep hygiene,  stress management, medication overuse education provided. [email or web address].     I have explained the treatment plan, diagnosis, and prognosis to patient. All questions are answered to the best of my knowledge.     Face to face time 30 minutes, including documentation, chart review, counseling, education, review of test results, relevant medical records, and coordination of care.       Korin Kaye MD   General Neurology  10/11/2023

## 2023-10-11 ENCOUNTER — OFFICE VISIT (OUTPATIENT)
Dept: NEUROLOGY | Facility: CLINIC | Age: 23
End: 2023-10-11
Payer: COMMERCIAL

## 2023-10-11 VITALS
SYSTOLIC BLOOD PRESSURE: 104 MMHG | WEIGHT: 171.19 LBS | HEART RATE: 50 BPM | OXYGEN SATURATION: 97 % | DIASTOLIC BLOOD PRESSURE: 66 MMHG | BODY MASS INDEX: 33.61 KG/M2 | HEIGHT: 60 IN | TEMPERATURE: 97 F

## 2023-10-11 DIAGNOSIS — Z23 FLU VACCINE NEED: ICD-10-CM

## 2023-10-11 DIAGNOSIS — Q90.9 DOWN SYNDROME: ICD-10-CM

## 2023-10-11 DIAGNOSIS — G43.809 VESTIBULAR MIGRAINE: ICD-10-CM

## 2023-10-11 DIAGNOSIS — T75.3XXS MOTION SICKNESS, SEQUELA: Primary | ICD-10-CM

## 2023-10-11 PROBLEM — T75.3XXA MOTION SICKNESS: Status: ACTIVE | Noted: 2023-10-11

## 2023-10-11 PROCEDURE — 1159F MED LIST DOCD IN RCRD: CPT | Mod: CPTII,,, | Performed by: PSYCHIATRY & NEUROLOGY

## 2023-10-11 PROCEDURE — 99214 OFFICE O/P EST MOD 30 MIN: CPT | Mod: PBBFAC | Performed by: PSYCHIATRY & NEUROLOGY

## 2023-10-11 PROCEDURE — 3008F PR BODY MASS INDEX (BMI) DOCUMENTED: ICD-10-PCS | Mod: CPTII,,, | Performed by: PSYCHIATRY & NEUROLOGY

## 2023-10-11 PROCEDURE — 3074F PR MOST RECENT SYSTOLIC BLOOD PRESSURE < 130 MM HG: ICD-10-PCS | Mod: CPTII,,, | Performed by: PSYCHIATRY & NEUROLOGY

## 2023-10-11 PROCEDURE — 99214 PR OFFICE/OUTPT VISIT, EST, LEVL IV, 30-39 MIN: ICD-10-PCS | Mod: S$PBB,,, | Performed by: PSYCHIATRY & NEUROLOGY

## 2023-10-11 PROCEDURE — 90471 IMMUNIZATION ADMIN: CPT | Mod: PBBFAC

## 2023-10-11 PROCEDURE — 1159F PR MEDICATION LIST DOCUMENTED IN MEDICAL RECORD: ICD-10-PCS | Mod: CPTII,,, | Performed by: PSYCHIATRY & NEUROLOGY

## 2023-10-11 PROCEDURE — 3074F SYST BP LT 130 MM HG: CPT | Mod: CPTII,,, | Performed by: PSYCHIATRY & NEUROLOGY

## 2023-10-11 PROCEDURE — 99214 OFFICE O/P EST MOD 30 MIN: CPT | Mod: S$PBB,,, | Performed by: PSYCHIATRY & NEUROLOGY

## 2023-10-11 PROCEDURE — 3078F PR MOST RECENT DIASTOLIC BLOOD PRESSURE < 80 MM HG: ICD-10-PCS | Mod: CPTII,,, | Performed by: PSYCHIATRY & NEUROLOGY

## 2023-10-11 PROCEDURE — 3008F BODY MASS INDEX DOCD: CPT | Mod: CPTII,,, | Performed by: PSYCHIATRY & NEUROLOGY

## 2023-10-11 PROCEDURE — 3078F DIAST BP <80 MM HG: CPT | Mod: CPTII,,, | Performed by: PSYCHIATRY & NEUROLOGY

## 2023-10-11 RX ORDER — MECLIZINE HCL 12.5 MG 12.5 MG/1
12.5 TABLET ORAL 4 TIMES DAILY PRN
Qty: 360 TABLET | Refills: 1 | Status: SHIPPED | OUTPATIENT
Start: 2023-10-11 | End: 2024-04-08

## 2024-06-27 ENCOUNTER — TELEPHONE (OUTPATIENT)
Dept: NEUROLOGY | Facility: CLINIC | Age: 24
End: 2024-06-27
Payer: COMMERCIAL

## 2024-06-27 NOTE — TELEPHONE ENCOUNTER
Good Morning    Patient's mother called and stated Ms Ibrahim is having headaches again and she would like her to be seen.     Does she need appointment with you or can she see MARY ANN Dickerson    Thank you

## 2024-06-28 ENCOUNTER — OFFICE VISIT (OUTPATIENT)
Dept: NEUROLOGY | Facility: CLINIC | Age: 24
End: 2024-06-28
Payer: COMMERCIAL

## 2024-06-28 VITALS
DIASTOLIC BLOOD PRESSURE: 79 MMHG | HEART RATE: 66 BPM | HEIGHT: 60 IN | SYSTOLIC BLOOD PRESSURE: 110 MMHG | BODY MASS INDEX: 31.22 KG/M2 | WEIGHT: 159 LBS | OXYGEN SATURATION: 98 %

## 2024-06-28 DIAGNOSIS — G43.809 VESTIBULAR MIGRAINE: Primary | ICD-10-CM

## 2024-06-28 DIAGNOSIS — T75.3XXS MOTION SICKNESS, SEQUELA: ICD-10-CM

## 2024-06-28 DIAGNOSIS — Q90.9 DOWN SYNDROME: ICD-10-CM

## 2024-06-28 PROCEDURE — 99213 OFFICE O/P EST LOW 20 MIN: CPT | Mod: PBBFAC | Performed by: NURSE PRACTITIONER

## 2024-06-28 RX ORDER — RIZATRIPTAN BENZOATE 10 MG/1
10 TABLET ORAL 2 TIMES DAILY PRN
Qty: 12 TABLET | Refills: 2 | Status: SHIPPED | OUTPATIENT
Start: 2024-06-28 | End: 2024-07-28

## 2024-06-28 RX ORDER — AMITRIPTYLINE HYDROCHLORIDE 10 MG/1
10 TABLET, FILM COATED ORAL NIGHTLY
Qty: 30 TABLET | Refills: 11 | Status: SHIPPED | OUTPATIENT
Start: 2024-06-28 | End: 2025-06-28

## 2024-06-28 RX ORDER — LANOLIN ALCOHOL/MO/W.PET/CERES
400 CREAM (GRAM) TOPICAL DAILY
Qty: 30 TABLET | Refills: 11 | Status: SHIPPED | OUTPATIENT
Start: 2024-06-28

## 2024-06-28 RX ORDER — AMITRIPTYLINE HYDROCHLORIDE 10 MG/1
0.5 TABLET, FILM COATED ORAL NIGHTLY
COMMUNITY
End: 2024-06-28

## 2024-06-28 RX ORDER — METHYLPREDNISOLONE 4 MG/1
TABLET ORAL
Qty: 21 EACH | Refills: 0 | Status: SHIPPED | OUTPATIENT
Start: 2024-06-28 | End: 2024-07-19

## 2024-06-28 RX ORDER — KETOROLAC TROMETHAMINE 30 MG/ML
30 INJECTION, SOLUTION INTRAMUSCULAR; INTRAVENOUS EVERY 6 HOURS
Qty: 1 ML | Refills: 11 | Status: SHIPPED | OUTPATIENT
Start: 2024-06-28

## 2024-06-28 RX ORDER — KETOROLAC TROMETHAMINE 30 MG/ML
30 INJECTION, SOLUTION INTRAMUSCULAR; INTRAVENOUS
Status: COMPLETED | OUTPATIENT
Start: 2024-06-28 | End: 2024-06-28

## 2024-06-28 RX ADMIN — KETOROLAC TROMETHAMINE 30 MG: 30 INJECTION, SOLUTION INTRAMUSCULAR; INTRAVENOUS at 11:06

## 2024-06-28 NOTE — PROGRESS NOTES
[facility] Neurology Follow Up Office Visit Note    Initial Visit Date: 6/27/2023  Last Visit Date: 6/27/2023  Current Visit Date:  06/28/2024    Chief Complaint:     Chief Complaint   Patient presents with    Headache     Patient c/o increase in headaches, starting several weeks ago. Restarted amitriptyline from past Rx approx 1 week ago.       History of Present Illness:      This is 24 y.o. female with history of Down syndrome with motion sickness and vestibular migraines.  Mild OS exophoria noted on exam. During last visit, she was started on Meclizine 12.5 mg 4 times a day as needed for motion sickness. Doing much better with neuro lenses.     Today, Pt presents with parents.Pt states her HA began again 2 weeks ago. Can be exacerbated by incense/candles or looking down (neck bent forward). Sometimes accompanied by dizziness, but not always. Are occurring daily, may occur several times daily. Restarted Elavil 10 mg 1/2 tab week ago with no improvement thus far, Tylenol 2 tabs and Ibuprofen 600 mg ineffective. Father states her eyes become red w/HA. Has appt with ophthalmology next week. Staying well hydrated. Admits to intermittent nausea, no vomiting.    Age of Onset : 22 years old, during a car ride.    Headache Description:  Dizzy, swings sensation, followed by frontal headache, without nausea, with photo and phonophobia.      Frequency: rare.     Provocation Factors:  Motion, car rides, quick eye movements, not sleeping well.    Risk Factors  - Family history of headache disorder: Yes extensive paternal family history of migraine disorder.      Medications:     Current Prophylactic  Amitriptyline 10 mg at bedtime (6/20/2024 - present) 1/2 tab nightly    Current Abortive  Tylenol - ineffective  Ibuprofen - ineffective  Rizatriptan 5 mg twice a day as needed (5/1/2023):  Unclear efficacy.    Prior Abortive  Metoclopramide 5 mg twice a day as needed (5/1/2023):  Not needed.  Meclizine 12.5 mg twice a day as needed  (4/19/2023):  Unclear if effective for car sickness.  Amitriptyline 10mg QHS (5/1/2023 - ?) did not require    Devices:     - VNS:  - TNS  - TMS:     Procedures:     - Botox:  - PSG block:   - Occipital nerve block:     Labs:     Results for orders placed or performed in visit on 08/01/22   Comprehensive Metabolic Panel   Result Value Ref Range    Sodium 140 136 - 145 mmol/L    Potassium 4.6 3.5 - 5.1 mmol/L    Chloride 104 98 - 107 mmol/L    CO2 26 22 - 29 mmol/L    Glucose 75 74 - 100 mg/dL    Blood Urea Nitrogen 13.5 7.0 - 18.7 mg/dL    Creatinine 0.81 0.55 - 1.02 mg/dL    Calcium 9.2 8.4 - 10.2 mg/dL    Protein Total 6.7 6.4 - 8.3 gm/dL    Albumin 4.2 3.5 - 5.0 gm/dL    Globulin 2.5 2.4 - 3.5 gm/dL    Albumin/Globulin Ratio 1.7 1.1 - 2.0 ratio    Bilirubin Total 1.4 <=1.5 mg/dL    ALP 52 40 - 150 unit/L    ALT 13 0 - 55 unit/L    AST 18 5 - 34 unit/L    Estimated GFR-Non  >60 mls/min/1.73/m2   CBC with Differential   Result Value Ref Range    WBC 3.8 (L) 4.5 - 11.5 x10(3)/mcL    RBC 4.27 4.20 - 5.40 x10(6)/mcL    Hgb 14.0 12.0 - 16.0 gm/dL    Hct 41.1 37.0 - 47.0 %    MCV 96.3 (H) 80.0 - 94.0 fL    MCH 32.8 (H) 27.0 - 31.0 pg    MCHC 34.1 33.0 - 36.0 mg/dL    RDW 12.0 11.5 - 17.0 %    Platelet 228 130 - 400 x10(3)/mcL    MPV 9.4 7.4 - 10.4 fL    Neut % 43.1 %    Lymph % 45.1 %    Mono % 8.8 %    Eos % 1.1 %    Basophil % 1.6 %    Lymph # 1.70 0.6 - 4.6 x10(3)/mcL    Neut # 1.6 (L) 2.1 - 9.2 x10(3)/mcL    Mono # 0.33 0.1 - 1.3 x10(3)/mcL    Eos # 0.04 0 - 0.9 x10(3)/mcL    Baso # 0.06 0 - 0.2 x10(3)/mcL    IG# 0.01 0 - 0.04 x10(3)/mcL    IG% 0.3 %    NRBC% 0.0 %   Amylase   Result Value Ref Range    Amylase Level 45 25 - 125 unit/L   Lipase   Result Value Ref Range    Lipase Level 11 <=60 U/L       Studies:     - MRI  Orbit 4/21/2023 at [facility]:  Results not available.  Reported normal.  - MRA Head w/o Luciano:   - MRV Head w/o Luciano:   - NCHCT:  - Lumbar Puncture:    Review of Systems:     Review of  Systems   All other systems reviewed and are negative.  As per HPI    Physical Exams:     Vitals:    06/28/24 1030   BP: 110/79   Pulse: 66     Physical Exam  Vitals and nursing note reviewed.   Constitutional:       Appearance: Normal appearance.   HENT:      Head: Atraumatic.      Comments: Syndrome facies     Right Ear: External ear normal.      Left Ear: External ear normal.      Nose: Nose normal.      Mouth/Throat:      Mouth: Mucous membranes are moist.      Pharynx: Oropharynx is clear.   Eyes:      Conjunctiva/sclera: Conjunctivae normal.   Cardiovascular:      Rate and Rhythm: Normal rate and regular rhythm.      Pulses: Normal pulses.   Pulmonary:      Effort: Pulmonary effort is normal.      Breath sounds: Normal breath sounds.   Abdominal:      General: Abdomen is flat. There is no distension.      Tenderness: There is no guarding.   Musculoskeletal:         General: Normal range of motion.      Cervical back: Normal range of motion.   Skin:     General: Skin is warm.      Coloration: Skin is not jaundiced.      Findings: No lesion or rash.   Neurological:      Mental Status: She is alert.     Comprehensive Neurological Exam:  Mental Status: Alert Oriented to Self, Date, and Place. Comprehension wnl. No dysarthria.   CN II - XII: TAMRA, No APD, VFFC, No ptosis OU, EOMI without nystagmus, normal saccadic eye movements on OKN strip, no saccadic intrusions, LT/Temp symmetric in CN V1-3 distribution, Hearing grossly intact, Face Symmetric, Tongue and Uvula midline, Trapezius symmetric bilateral.   Motor: tone and bulk wnl throughout, no abnormal involuntary or voluntary movements, 5/5 to confrontation, Fine finger movements wnl b/l, No pronator drift.   Sensory: LT, Proprioception, Vibration, PP, Temp symmetric.   Reflexes: 1+ throughout  Cerebellar: FNF wnl b/l  Romberg:  Absent.  Gait: toe gait, heel gait normal, mild difficulty w/tandem    Assessment:     This is 24 y.o. female with history of Down  syndrome, who was referred for motion sickness and vestibular migraines.  Mild OS exophoria noted on exam. Migraines have flared over last two weeks. Currently taking old Rx for amitriptyline 1/2 tab (5mg) nightly for last week, with no improvement. Rizatriptan 5mg ineffective as abortive therapy, but migraines continuous for last two weeks accompanied by intermittent dizziness and nausea. OTC medications ineffective.    Problem List Items Addressed This Visit          Neuro    Vestibular migraine - Primary       Genetic    Down syndrome       Other    Motion sickness         Plan:     [] increase Amitriptyline to whole 10 mg tablet nightly  [] increase Rizatriptan 10 at onset of migraine may repeat in 2 hrs if need  [] restart Meclizine 12.5 mg 4 times a day as needed, 30 minutes to an hour before car rides.   [] start Mag Ox 400 mg 1/2 tab daily, may increase to BID if need  [] Toradol 10mg IM PRN at home to be administered by Pt's father (who is an MD)  [] consider riboflavin 400mg daily  [] start medrol dose pack for current HA  [] Toradol 30mg IM today for current HA  [] consider Nurtec QOD at next visit    RTC 6 weeks - okay to double book    Headache education provided: good sleep hygiene, stress management, medication overuse education provided.[email or web address].     I have explained the treatment plan, diagnosis, and prognosis to patient. All questions are answered to the best of my knowledge.     Face to face time 40 minutes, including documentation, chart review, counseling, education, review of test results, relevant medical records, and coordination of care.     Jayla Fregoso, MARY ANN-C  General Neurology  06/28/2024

## 2024-08-09 ENCOUNTER — OFFICE VISIT (OUTPATIENT)
Dept: NEUROLOGY | Facility: CLINIC | Age: 24
End: 2024-08-09
Payer: COMMERCIAL

## 2024-08-09 VITALS
BODY MASS INDEX: 31.61 KG/M2 | DIASTOLIC BLOOD PRESSURE: 81 MMHG | OXYGEN SATURATION: 98 % | WEIGHT: 161 LBS | HEIGHT: 60 IN | HEART RATE: 63 BPM | SYSTOLIC BLOOD PRESSURE: 114 MMHG

## 2024-08-09 DIAGNOSIS — G43.809 VESTIBULAR MIGRAINE: Primary | ICD-10-CM

## 2024-08-09 PROCEDURE — 99214 OFFICE O/P EST MOD 30 MIN: CPT | Mod: PBBFAC | Performed by: NURSE PRACTITIONER

## 2024-09-09 DIAGNOSIS — G43.809 VESTIBULAR MIGRAINE: ICD-10-CM

## 2024-09-09 RX ORDER — AMITRIPTYLINE HYDROCHLORIDE 10 MG/1
10 TABLET, FILM COATED ORAL NIGHTLY
Qty: 30 TABLET | Refills: 11 | Status: SHIPPED | OUTPATIENT
Start: 2024-09-09 | End: 2025-09-09

## 2024-10-24 DIAGNOSIS — G43.809 VESTIBULAR MIGRAINE: ICD-10-CM

## 2024-10-24 RX ORDER — LANOLIN ALCOHOL/MO/W.PET/CERES
200 CREAM (GRAM) TOPICAL DAILY
Qty: 15 TABLET | Refills: 11 | Status: SHIPPED | OUTPATIENT
Start: 2024-10-24